# Patient Record
Sex: MALE | Race: WHITE | NOT HISPANIC OR LATINO | URBAN - METROPOLITAN AREA
[De-identification: names, ages, dates, MRNs, and addresses within clinical notes are randomized per-mention and may not be internally consistent; named-entity substitution may affect disease eponyms.]

---

## 2018-10-04 ENCOUNTER — OFFICE VISIT (OUTPATIENT)
Dept: INTERNAL MEDICINE | Facility: CLINIC | Age: 38
End: 2018-10-04
Payer: COMMERCIAL

## 2018-10-04 ENCOUNTER — TELEPHONE (OUTPATIENT)
Dept: RHEUMATOLOGY | Facility: CLINIC | Age: 38
End: 2018-10-04

## 2018-10-04 VITALS
SYSTOLIC BLOOD PRESSURE: 144 MMHG | RESPIRATION RATE: 16 BRPM | HEART RATE: 65 BPM | OXYGEN SATURATION: 100 % | DIASTOLIC BLOOD PRESSURE: 83 MMHG

## 2018-10-04 DIAGNOSIS — Z23 NEED FOR INFLUENZA VACCINATION: ICD-10-CM

## 2018-10-04 DIAGNOSIS — Z11.3 ROUTINE SCREENING FOR STI (SEXUALLY TRANSMITTED INFECTION): Primary | ICD-10-CM

## 2018-10-04 DIAGNOSIS — K75.9 HEPATITIS: ICD-10-CM

## 2018-10-04 DIAGNOSIS — M05.9 SEROPOSITIVE RHEUMATOID ARTHRITIS (H): ICD-10-CM

## 2018-10-04 DIAGNOSIS — E61.0 COPPER DEFICIENCY: ICD-10-CM

## 2018-10-04 DIAGNOSIS — Z11.3 ROUTINE SCREENING FOR STI (SEXUALLY TRANSMITTED INFECTION): ICD-10-CM

## 2018-10-04 LAB
ALBUMIN SERPL-MCNC: 4.1 G/DL (ref 3.4–5)
ALP SERPL-CCNC: 61 U/L (ref 40–150)
ALT SERPL W P-5'-P-CCNC: 88 U/L (ref 0–70)
ANION GAP SERPL CALCULATED.3IONS-SCNC: 5 MMOL/L (ref 3–14)
AST SERPL W P-5'-P-CCNC: 30 U/L (ref 0–45)
BILIRUB SERPL-MCNC: 0.5 MG/DL (ref 0.2–1.3)
BUN SERPL-MCNC: 9 MG/DL (ref 7–30)
CALCIUM SERPL-MCNC: 9.1 MG/DL (ref 8.5–10.1)
CHLORIDE SERPL-SCNC: 104 MMOL/L (ref 94–109)
CO2 SERPL-SCNC: 29 MMOL/L (ref 20–32)
CREAT SERPL-MCNC: 0.93 MG/DL (ref 0.66–1.25)
GFR SERPL CREATININE-BSD FRML MDRD: >90 ML/MIN/1.7M2
GLUCOSE SERPL-MCNC: 91 MG/DL (ref 70–99)
POTASSIUM SERPL-SCNC: 4.4 MMOL/L (ref 3.4–5.3)
PROT SERPL-MCNC: 8.3 G/DL (ref 6.8–8.8)
SODIUM SERPL-SCNC: 138 MMOL/L (ref 133–144)

## 2018-10-04 ASSESSMENT — ENCOUNTER SYMPTOMS
TINGLING: 1
DECREASED CONCENTRATION: 1
SORE THROAT: 0
WEIGHT GAIN: 0
DEPRESSION: 1
NECK PAIN: 1
SEIZURES: 0
BACK PAIN: 1
SMELL DISTURBANCE: 0
WEIGHT LOSS: 0
LOSS OF CONSCIOUSNESS: 0
BOWEL INCONTINENCE: 0
CHILLS: 1
WEAKNESS: 0
TASTE DISTURBANCE: 0
FATIGUE: 1
MUSCLE CRAMPS: 1
POLYPHAGIA: 0
FEVER: 1
NAIL CHANGES: 1
PARALYSIS: 0
NECK MASS: 0
NIGHT SWEATS: 0
HEARTBURN: 1
TROUBLE SWALLOWING: 0
INCREASED ENERGY: 1
POLYDIPSIA: 1
DIARRHEA: 0
SKIN CHANGES: 0
ARTHRALGIAS: 1
NAUSEA: 0
BLOATING: 0
TREMORS: 0
INSOMNIA: 1
HOARSE VOICE: 0
VOMITING: 0
NERVOUS/ANXIOUS: 1
STIFFNESS: 1
PANIC: 0
BLOOD IN STOOL: 0
SINUS CONGESTION: 0
NUMBNESS: 0
MUSCLE WEAKNESS: 1
CONSTIPATION: 0
HEADACHES: 0
SPEECH CHANGE: 0
ALTERED TEMPERATURE REGULATION: 1
RECTAL PAIN: 0
JAUNDICE: 0
HALLUCINATIONS: 0
DISTURBANCES IN COORDINATION: 0
DIZZINESS: 0
ABDOMINAL PAIN: 1
SINUS PAIN: 0
POOR WOUND HEALING: 0
MYALGIAS: 1
DECREASED APPETITE: 0
JOINT SWELLING: 1
MEMORY LOSS: 0

## 2018-10-04 ASSESSMENT — ANXIETY QUESTIONNAIRES
3. WORRYING TOO MUCH ABOUT DIFFERENT THINGS: SEVERAL DAYS
6. BECOMING EASILY ANNOYED OR IRRITABLE: SEVERAL DAYS
1. FEELING NERVOUS, ANXIOUS, OR ON EDGE: SEVERAL DAYS
5. BEING SO RESTLESS THAT IT IS HARD TO SIT STILL: SEVERAL DAYS
2. NOT BEING ABLE TO STOP OR CONTROL WORRYING: SEVERAL DAYS
IF YOU CHECKED OFF ANY PROBLEMS ON THIS QUESTIONNAIRE, HOW DIFFICULT HAVE THESE PROBLEMS MADE IT FOR YOU TO DO YOUR WORK, TAKE CARE OF THINGS AT HOME, OR GET ALONG WITH OTHER PEOPLE: SOMEWHAT DIFFICULT
7. FEELING AFRAID AS IF SOMETHING AWFUL MIGHT HAPPEN: NOT AT ALL
GAD7 TOTAL SCORE: 6

## 2018-10-04 ASSESSMENT — PAIN SCALES - GENERAL: PAINLEVEL: EXTREME PAIN (8)

## 2018-10-04 ASSESSMENT — PATIENT HEALTH QUESTIONNAIRE - PHQ9: 5. POOR APPETITE OR OVEREATING: SEVERAL DAYS

## 2018-10-04 NOTE — PROGRESS NOTES
PRIMARY CARE CENTER         HPI:       Jeff Latham is a 37 year old male who presents to [facility] care.     Patient has a history of seropositive RA, previously followed with rheum in the [facility] system. His previous regimen has been IV Simponi (golimumab) 2mg/kg q2 months starting 4/2018, azathioprine 150mg qday, plaquenil 200mg BID, celebrex 100mg BID. He was previously on Orencia (abatacept), methotrexate, Enbrel, Humira, rituximab, Xeljanz, Actemra, leflunomide, and prednisone. All of which were either ineffective or not  Tolerated. He is also on gabapentin and Cymbalta for chronic pain.     Labs checked at routine rheum visit in 7/2018 showed mild elevated in ALT to 80, thus azathioprine was not increased. Labs were repeated and showed further elevation in AST (78) and ALT (187) and thus azathioprine was held as of 8/6/18. At that time patient also developed abdominal pain. He was then seen in urgent care on 8/7/18 for sharp RUQ abdominal pain that had been worsening for a few days. Patient was referred to the ED. In the ED, labs showed further rise in LFTS (AST 97, ) but with normal bili, lipase, CBC, and BMP. An U/S was unremarkable and he was discharged to home with plan to follow-up outpatient. On 8/9/18 labs were checked again and continued to rise (, ). He was told to stop Celebrex and Cymbalta and he was referred to hepatology.     On 8/13/18 he was seen by hepatology for eval of elevated LFTs. Labs were ordered to eval for AI liver disease and genetic causes, and plan for biopsy was made. Labs were unrevealing.     He then had recurrence of severe RUQ pain on 8/20/18, and presented to the ED on 8/23/18 despite labs actually improving (AST 75, ). He was discharged to home from ED with a few tabs of oxycodone to help with pain. Liver biopsy returned normal, with no signs of inflammation and only very focal fibrosis which is considered a normal to mild  "finding. However, ceruloplasmin (checked at initial hepatology eval) returned low and thus a urine copper was ordered. Patient was contacted by rheum on 8/29/18 to discuss restarting some of his RA meds, but patient elected to continue to hold them as he felt he may not have RA and that his whole problem may be Guille's disease. He therefore stopped plaquenil and gabapentin in addition to the azathioprine and Celebrex.     Urine copper study returned in early 9/2018 with actually low levels, not consistent with Guille's. It was recommended that he start a multivitamin with copper and he was referred to a dietician. He has not done either of these things and he has had no further visits in the [facility] system. Patient is now here to establish care in the [place] system.     Patient initially diagnosed with RA in 6/2014 with elevated RF, but negative anti-CCP and RADAH. Presenting symptoms were pain in legs and hands. Locates to PIP in hands, ankles, and MTPs in feet. Describes pain as feeling achy, like he worked out really hard the day before, feels like he wants to crack all of his joints. Joints sometimes feel swollen. Pain not really associated with time of day. States if he is more sedentary then pain is hips, knees, ankles. If rests well, then pain is actually better. If sleep is not restful, then will be achy all over. If sits for prolonged periods of time, then gets feeling of \"dead leg\" that starts in bilateral buttock and states goes all the way down his legs into his feet. States when that happens he can still feel sensation on his skin. States sitting in chairs with legs down hurts, so sits in chairs with his legs crossed. If he sits in a chair for too long, then will get achy in his lateral hip area and buttock. Denies fevers, but endorses temperature swings.     States when on \"treatment\" for RA, he actually feels worse. Feels flushed, bloated, feeling like a michelin man. When on treatment also " has temperature swings that are dramatic. Since going off treatment however, feels all of his symptoms are more manageable. Feels like his side effect symptoms are all improved, so the symptoms above are all easier to deal with. States biggest benefit was coming off the gabapentin. Side effects described as muscle aches, nausea, fatigue, poor memory. States now muscle aches are gone and he can walk again, he has no nausea, energy is kind of unchanged (has more of an interest in trying to do something, but still is limited by pain), and states he has his memory back.     Patient has been checked for STIs recently, and would like testing today.       Problem, Medication and Allergy Lists were reviewed and are current.   There are no active problems to display for this patient.        No current outpatient prescriptions on file.         Allergies   Allergen Reactions     Nsaids Other (See Comments)     Gi bleeding     Tetanus Toxoids Other (See Comments)     Swelling  Fever   myalgia     Patient is   a new patient to this clinic and so  I reviewed/updated the Past Medical History, the Family History and the Social History. ,   Past Medical History:   Diagnosis Date     Chronic allergic rhinitis      Seropositive rheumatoid arthritis (H)    ,   Family History     Problem (# of Occurrences) Relation (Name,Age of Onset)    Diabetes (2) Maternal Grandmother, Paternal Grandmother       and   Social History     Social History     Marital status:      Spouse name: N/A     Number of children: N/A     Years of education: N/A     Social History Main Topics     Smoking status: Former Smoker     Packs/day: 0.25     Years: 15.00     Types: Cigarettes     Quit date: 1/1/2010     Smokeless tobacco: Never Used     Alcohol use 2.4 - 3.6 oz/week     4 - 6 Glasses of wine per week     Drug use: No      Comment: rare use of marijuana     Sexual activity: Yes     Partners: Male     Birth control/ protection: Condom     Other Topics  Concern     None     Social History Narrative     None            Review of Systems:   ROS  I have personally reviewed and updated the complete ROS on the day of the visit.           Physical Exam:   /87  Pulse 66  Resp 16  SpO2 100%  There is no height or weight on file to calculate BMI.  Vitals were reviewed       GENERAL APPEARANCE: healthy, alert and no distress     EYES: EOMI,  PERRL     HENT: NC/AT, ear canals and TM's normal and nose and mouth without ulcers or lesions     NECK: no adenopathy, no asymmetry, masses, or scars and thyroid normal to palpation     RESP: lungs clear to auscultation - no rales, rhonchi or wheezes     CV: regular rate and rhythm, normal S1 S2, no S3 or S4 and no murmur, click or rub     ABDOMEN:  soft, nontender, no HSM or masses and bowel sounds normal     MS: extremities normal- no gross deformities noted, no evidence of inflammation in joints, FROM in all extremities.     SKIN: no suspicious lesions or rashes     NEURO: AOx3, CN's II-XII grossly intact, strength normal, no sensory deficit     PSYCH: mentation appears normal. and affect normal/bright        Results:      Results from the last 24 hoursNo results found for this or any previous visit (from the past 24 hour(s)).  Assessment and Plan     Jeff was seen today for establish care.    Unclear to me at this time what the primary issue is for this patient. He is a difficult historian, but best I can tell from reviewing his history and labs, he was dx-ed with RA based off of vague joint pain symptoms and a positive RF. It is interesting that patient has not responded to many, many therapies; however, now has no evidence of active disease. There are some aspects of history that may be more representative of fibromyalgia, but further evaluation by rheumatology is necessary.     Routine screening for STI (sexually transmitted infection)  -     N. gonorrhea PCR - Urine, Clinic Collect  -     Chlamydia trachomatis PCR  -      HIV Antigen Antibody Combo; Future    Seropositive rheumatoid arthritis (H)  Referrals provided for ealth rheum and outside referral for faster timeline  -     RHEUMATOLOGY REFERRAL  -  Will defer additional lab evaluation until seen by rheum    Hepatitis  -     Comprehensive metabolic panel; Future    Copper deficiency  Unclear why patient has copper deficiency. Can be nutritional. Do wonder if copper deficiency is contributing to some of his vague symptoms of numbness/pain in legs and incoordination.   -     NUTRITION REFERRAL  -  Patient to start a multivitamin containing copper    Need for influenza vaccination  -     FLU VACCINE, 3 YRS +, IM (FLUZONE)  -     VACCINE ADMINISTRATION, INITIAL      Options for treatment and follow-up care were reviewed with the patient. Jeff Lee Yeison engaged in the decision making process and verbalized understanding of the options discussed and agreed with the final plan.    Patient should return to clinic to follow-up with me in 4-6 months after he has seen rheumatology.     Saskia Do MD  Internal Medicine PGY-3  P: [id number]  Oct 4, 2018    Plan of care discussed with Dr. Vergara.     While the patient was in clinic, I reviewed the pertinent medical history and results.  I discussed the current findings on physical examination, as well as the patient s diagnosis and treatment plan with the resident and agree with the information as documented with the following exceptions: none.   Nirav Vergara MD

## 2018-10-04 NOTE — PATIENT INSTRUCTIONS
[facility]         Internal Medicine Resident                   Continuity Clinic    Who We Are    Resident Continuity Clinic is a part of the [facility] Primary Care Clinic.  Resident physicians see patients independently and establish a relationship with them over the course of their three-year residency program.  As with the Primary Care Clinic, our Resident Continuity Clinic models a group practice.  If your doctor is not available, you will be able to see another resident physician.  At the end of a resident s training, patients will be transitioned to a new resident physician for ongoing care.     We treat patients with a wide array of medical needs from routine physicals, to acute illnesses, to diabetes and blood pressure management, to complex medical illness.  What is a Resident Physician?    Resident physicians hold medical degrees and are doctors. They are training to become specialists in Internal Medicine. They work under the supervision of board-certified faculty physicians.  Expectations for Your Care    We strive to provide accessible, quality care at all times.    In order to provide this care, it is best to see your primary care resident doctor consistently rather switching between providers.  In the event you do see another physician, you should schedule a follow-up visit with your usual primary care doctor.    If you are transitioning your care from another clinic, it is helpful to have your records available for your doctor to review.    We do not prescribe controlled substances, such as ADD medications or narcotic pain medications, on your first visit.  We will review your health records and concerns prior to devising a treatment plan with you in order to provide the best care.      Clinic Services     Extended clinic hours; patient  to help navigate your visit;  parking; laboratory and imaging services with evening and weekend hours    Multiple medical and  surgical specialties in one building    Complementary services, including Nutrition, Integrative Medicine, Pharmacy consultations, Mental and Behavioral Health, Sports Medicine and Physical Therapy    Thank You    We would like to thank you for choosing the [facility] Internal Medicine Resident Continuity Clinic for your primary care. You are making a priceless contribution to the training of the next generation of health care practitioners.     Contact us at [phone] for appointments or questions.    Resident Clinic Hours are Tuesdays and Thursdays, 7:30am-5:00pm    Residents  Clover Angela MD   (Female )   Hortencia Roth MD   (Female)   Luis Enrique Robles MD  (Male)   Jeff Moody MD  (Male)   Helena Price MD   (Female)   Jaret Gardner MD  (Male)    Kemal Calixto MD  (Male)   Jose L Wellington MD  (Male)   Jeff Gupta MD (Male)   Pradeep Garibay MD  (Male)   Muna Ramon MD (Female)    Ratna Espinoza MD (Female)   Emerald Dickinson MD  (Male)   Wlila Colin MD(Female)   Saskia Do MD  (Female)    Supervising Physicians   MD Sherron Valdez MD Briar Duffy, MD James Langland, MD Mary Logeais, MD Tanya Melnik, MD Charles Moldow, MD Heather Thompson Buum, MD Kathleen Watson, MD          Primary Care Center Phone Number [phone]  Primary Care Center Medication Refill Request Information:  * Please contact your pharmacy regarding ANY request for medication refills.  ** [facility] Prescription Fax = [phone]  * Please allow 3 business days for routine medication refills.  * Please allow 5 business days for controlled substance medication refills.     Primary Care Center Test Result notification information:  *You will be notified with in 7-10 days of your appointment day regarding the results of your test.  If you are on MyChart you will be notified as soon as the provider has reviewed the results and signed off on them.      What we  talked about today:  - Check some basic labs today (liver function, STI)  - Referral to rheumatology (can also be seen at [facility], they can usually get you in much quicker)  - Start a multivitamin that contains copper  - Referral to nutritionist  - Follow-up in 4-6 months to check in

## 2018-10-04 NOTE — MR AVS SNAPSHOT
After Visit Summary   10/4/2018    Jeff Latham    MRN: [id number]           Patient Information     Date Of Birth          1980        Visit Information        Provider Department      10/4/2018 1:10 PM Saskia Do MD [facility]        Today's Diagnoses     Routine screening for STI (sexually transmitted infection)    -  1    Seropositive rheumatoid arthritis (H)        Hepatitis        Copper deficiency        Need for influenza vaccination          Care Instructions           [facility]         Internal Medicine Resident                   Continuity Clinic    Who We Are    Resident Continuity Clinic is a part of the [facility] Primary Care Clinic.  Resident physicians see patients independently and establish a relationship with them over the course of their three-year residency program.  As with the Primary Care Clinic, our Resident Continuity Clinic models a group practice.  If your doctor is not available, you will be able to see another resident physician.  At the end of a resident s training, patients will be transitioned to a new resident physician for ongoing care.     We treat patients with a wide array of medical needs from routine physicals, to acute illnesses, to diabetes and blood pressure management, to complex medical illness.  What is a Resident Physician?    Resident physicians hold medical degrees and are doctors. They are training to become specialists in Internal Medicine. They work under the supervision of board-certified faculty physicians.  Expectations for Your Care    We strive to provide accessible, quality care at all times.    In order to provide this care, it is best to see your primary care resident doctor consistently rather switching between providers.  In the event you do see another physician, you should schedule a follow-up visit with your usual primary care doctor.    If you are transitioning your care from another  clinic, it is helpful to have your records available for your doctor to review.    We do not prescribe controlled substances, such as ADD medications or narcotic pain medications, on your first visit.  We will review your health records and concerns prior to devising a treatment plan with you in order to provide the best care.      Clinic Services     Extended clinic hours; patient  to help navigate your visit;  parking; laboratory and imaging services with evening and weekend hours    Multiple medical and surgical specialties in one building    Complementary services, including Nutrition, Integrative Medicine, Pharmacy consultations, Mental and Behavioral Health, Sports Medicine and Physical Therapy    Thank You    We would like to thank you for choosing the [facility] Internal Medicine Resident Continuity Clinic for your primary care. You are making a priceless contribution to the training of the next generation of health care practitioners.     Contact us at [phone] for appointments or questions.    Resident Clinic Hours are Tuesdays and Thursdays, 7:30am-5:00pm    Residents  Clover Angela MD   (Female )   Hortencia Roth MD   (Female)   Luis Enrique Robles MD  (Male)   Jeff Moody MD  (Male)   Helena Price MD   (Female)   Jaret Gardner MD  (Male)    Kemal Calixto MD  (Male)   Jose L Wellington MD  (Male)   Jeff Gupta MD (Male)   Pradeep Garibay MD  (Male)   Muna Ramon MD (Female)    Ratna Espinoza MD (Female)   Emerald Dickinson MD  (Male)   Willa Colin MD(Female)   Saskia Do MD  (Female)    Supervising Physicians   MD Sherron Valdez MD Briar Duffy, MD James Langland, MD Mary Logeais, MD Tanya Melnik, MD Charles Moldow, MD Heather Thompson Buum, MD Kathleen Watson, MD          Primary Care Center Phone Number [phone]  [facility] Medication Refill Request Information:  * Please contact your  pharmacy regarding ANY request for medication refills.  ** [facility] Prescription Fax = [phone]  * Please allow 3 business days for routine medication refills.  * Please allow 5 business days for controlled substance medication refills.     Primary Care Center Test Result notification information:  *You will be notified with in 7-10 days of your appointment day regarding the results of your test.  If you are on MyChart you will be notified as soon as the provider has reviewed the results and signed off on them.      What we talked about today:  - Check some basic labs today (liver function, STI)  - Referral to rheumatology (can also be seen at [facility] rheumatology, they can usually get you in much quicker)  - Start a multivitamin that contains copper  - Referral to nutritionist  - Follow-up in 4-6 months to check in             Follow-ups after your visit        Additional Services     NUTRITION REFERRAL       Your provider has referred you to: [facility]: [facility] (on call location)  - [place] [phone]   [email or web address]    Please be aware that coverage of these services is subject to the terms and limitations of your health insurance plan.  Call member services at your health plan with any benefit or coverage questions.      Please bring the following with you to your appointment:    (1) This referral request  (2) Any documents given to you regarding this referral  (3) Any specific questions you have about diet and/or food choices            RHEUMATOLOGY REFERRAL       Your provider has referred you to: [facility]: Rhe[facility]ology [facility] - [place] [phone]   [email or web address]    Please be aware that coverage of these services is subject to the terms and limitations of your health insurance plan.  Call member services at your health plan with any benefit or coverage questions.      Please bring the following with you to your  appointment:    (1) Any X-Rays, CTs or MRIs which have been performed.  Contact the facility where they were done to arrange for  prior to your scheduled appointment.    (2) List of current medications   (3) This referral request   (4) Any documents/labs given to you for this referral            RHEUMATOLOGY REFERRAL       [facility]:  Appointments: [phone]    Cl[facility] Center  [address] [place] [id number]    Please be aware that coverage of these services is subject to the terms and limitations of your health insurance plan.  Call member services at your health plan with any benefit or coverage questions.      Please bring the following with you to your appointment:    (1) Any X-Rays, CTs or MRIs which have been performed.  Contact the facility where they were done to arrange for  prior to your scheduled appointment.    (2) List of current medications   (3) This referral request   (4) Any documents/labs given to you for this referral                  Future tests that were ordered for you today     Open Future Orders        Priority Expected Expires Ordered    HIV Antigen Antibody Combo Routine 10/4/2018 10/4/2019 10/4/2018    Comprehensive metabolic panel Routine 10/4/2018 10/18/2018 10/4/2018            Who to contact     Please call your clinic at [phone] to:    Ask questions about your health    Make or cancel appointments    Discuss your medicines    Learn about your test results    Speak to your doctor            Additional Information About Your Visit        [employer] Information     [employer] gives you secure access to your electronic health record. If you see a primary care provider, you can also send messages to your care team and make appointments. If you have questions, please call your primary care clinic.  If you do not have a primary care provider, please call [phone] and they will assist you.      [employer] is an electronic gateway that provides  easy, online access to your medical records. With [employer], you can request a clinic appointment, read your test results, renew a prescription or communicate with your care team.     To access your existing account, please contact your [facility] or call [phone] for assistance.        Care EveryWhere ID     This is your Care EveryWhere ID. This could be used by other organizations to access your [place] medical records  [id number]        Your Vitals Were     Pulse Respirations Pulse Oximetry             66 16 100%          Blood Pressure from Last 3 Encounters:   10/04/18 144/87    Weight from Last 3 Encounters:   No data found for Wt              We Performed the Following     Chlamydia trachomatis PCR     FLU VACCINE, 3 YRS +, IM (FLUZONE)     N. gonorrhea PCR - Urine, Clinic Collect     NUTRITION REFERRAL     RHEUMATOLOGY REFERRAL     RHEUMATOLOGY REFERRAL     VACCINE ADMINISTRATION, INITIAL        Primary Care Provider Office Phone # Fax #    Noel Hines [phone]       [place] [address]        Equal Access to Services     CÉSAR TREVIZO AH: Hadii aad ku hadasho Soomaali, waaxda luqadaha, qaybta kaalmada adeegyada, waxay idiin hayaan adeeg jennifer sarah . So [facility] [phone].    ATENCIÓN: Si habla español, tiene a maurer disposición servicios gratuitos de asistencia lingüística. Llame al [phone].    We comply with applicable federal civil rights laws and [place] laws. We do not discriminate on the basis of race, color, national origin, age, disability, sex, sexual orientation, or gender identity.            Thank you!     Thank you for choosing [facility] PRIMARY CARE [facility]  for your care. Our goal is always to provide you with excellent care. Hearing back from our patients is one way we can continue to improve our services. Please take a few minutes to complete the written survey that you may receive in the mail after your  visit with us. Thank you!             Your Updated Medication List - Protect others around you: Learn how to safely use, store and throw away your medicines at www.disposemymeds.org.      Notice  As of 10/4/2018  2:17 PM    You have not been prescribed any medications.

## 2018-10-04 NOTE — NURSING NOTE
Chief Complaint   Patient presents with     Establish Care     pt is here to establish care with new PCP        Clover Whiting CMA at 1:00 PM on 10/4/2018

## 2018-10-04 NOTE — NURSING NOTE
Jeff Latham    1.  Has the patient received the information for the influenza vaccine? YES    2.  Does the patient have any of the following contraindications?     Allergy to eggs? No     Allergic reaction to previous influenza vaccines? No     Any other problems to previous influenza vaccines? No     Paralyzed by Guillain-Hudson syndrome? No     Currently pregnant? NO     Current moderate or severe illness? No     Allergy to contact lens solution? No    3.  The vaccine has been administered in the usual fashion and the patient was instructed to wait 20 minutes before leaving the building in the event of an allergic reaction: YES    Vaccination given by Clover Whiting CMA at 2:22 PM on 10/4/2018  Recorded by Clover Whiting CMA

## 2018-10-04 NOTE — TELEPHONE ENCOUNTER
[facility] Call Center    Phone Message    May a detailed message be left on voicemail: yes    Reason for Call: Other: Rheum     Action Taken: Message routed to:  Other: Dr. Vergara from [facility] is referring pt to Rheum, please call pt with an appt

## 2018-10-05 LAB
C TRACH DNA SPEC QL NAA+PROBE: NEGATIVE
HIV 1+2 AB+HIV1 P24 AG SERPL QL IA: NONREACTIVE
N GONORRHOEA DNA SPEC QL NAA+PROBE: NEGATIVE
SPECIMEN SOURCE: NORMAL
SPECIMEN SOURCE: NORMAL

## 2018-10-05 ASSESSMENT — ANXIETY QUESTIONNAIRES: GAD7 TOTAL SCORE: 6

## 2018-10-25 NOTE — TELEPHONE ENCOUNTER
Spoke with patient who stated that he does not wish to schedule at this time.   Tiffany Quinonez CMA  10/25/2018 2:25 PM

## 2020-03-11 ENCOUNTER — HEALTH MAINTENANCE LETTER (OUTPATIENT)
Age: 40
End: 2020-03-11

## 2021-01-03 ENCOUNTER — HEALTH MAINTENANCE LETTER (OUTPATIENT)
Age: 41
End: 2021-01-03

## 2021-04-25 ENCOUNTER — HEALTH MAINTENANCE LETTER (OUTPATIENT)
Age: 41
End: 2021-04-25

## 2021-10-10 ENCOUNTER — HEALTH MAINTENANCE LETTER (OUTPATIENT)
Age: 41
End: 2021-10-10

## 2022-05-21 ENCOUNTER — HEALTH MAINTENANCE LETTER (OUTPATIENT)
Age: 42
End: 2022-05-21

## 2022-09-18 ENCOUNTER — HEALTH MAINTENANCE LETTER (OUTPATIENT)
Age: 42
End: 2022-09-18

## 2023-06-04 ENCOUNTER — HEALTH MAINTENANCE LETTER (OUTPATIENT)
Age: 43
End: 2023-06-04